# Patient Record
Sex: FEMALE | Race: WHITE | Employment: FULL TIME | URBAN - METROPOLITAN AREA
[De-identification: names, ages, dates, MRNs, and addresses within clinical notes are randomized per-mention and may not be internally consistent; named-entity substitution may affect disease eponyms.]

---

## 2017-08-18 ENCOUNTER — HOSPITAL ENCOUNTER (OUTPATIENT)
Age: 22
Discharge: HOME | End: 2017-08-18
Attending: FAMILY MEDICINE
Payer: OTHER MISCELLANEOUS

## 2017-08-18 VITALS
HEART RATE: 90 BPM | SYSTOLIC BLOOD PRESSURE: 134 MMHG | BODY MASS INDEX: 25.46 KG/M2 | WEIGHT: 118 LBS | OXYGEN SATURATION: 97 % | HEIGHT: 57 IN | RESPIRATION RATE: 18 BRPM | DIASTOLIC BLOOD PRESSURE: 80 MMHG | TEMPERATURE: 99 F

## 2017-08-18 DIAGNOSIS — S39.012A LUMBAR STRAIN, INITIAL ENCOUNTER: Primary | ICD-10-CM

## 2017-08-18 PROCEDURE — 96372 THER/PROPH/DIAG INJ SC/IM: CPT

## 2017-08-18 PROCEDURE — 99203 OFFICE O/P NEW LOW 30 MIN: CPT

## 2017-08-18 PROCEDURE — 99204 OFFICE O/P NEW MOD 45 MIN: CPT

## 2017-08-18 RX ORDER — CYCLOBENZAPRINE HCL 10 MG
10 TABLET ORAL NIGHTLY
Qty: 7 TABLET | Refills: 0 | Status: SHIPPED | OUTPATIENT
Start: 2017-08-18 | End: 2017-08-25

## 2017-08-18 RX ORDER — NAPROXEN 375 MG/1
375 TABLET ORAL 2 TIMES DAILY WITH MEALS
Qty: 20 TABLET | Refills: 0 | Status: SHIPPED | OUTPATIENT
Start: 2017-08-18

## 2017-08-18 RX ORDER — KETOROLAC TROMETHAMINE 30 MG/ML
60 INJECTION, SOLUTION INTRAMUSCULAR; INTRAVENOUS ONCE
Status: COMPLETED | OUTPATIENT
Start: 2017-08-18 | End: 2017-08-18

## 2017-08-18 NOTE — ED PROVIDER NOTES
Patient Seen in: [address]    History   Patient presents with:  Hip Pain    Stated Complaint: Righ low back injury workers comp x 1 day    HPI    Patient visiting from [place] with past medical history significant for bon TTP  No Midline Sacral TTP  No Paraspinal Sacral TTP    R SLR neg  L SLR neg    Pain w/ L Hip Flexion neg  Pain w/ L Hip Rotation neg  Pain w/ R Hip Flexion neg  Pain w/ R internal Hip Rotation neg.  Pain with external rotation    Full Forward Flexion with

## 2017-08-18 NOTE — ED INITIAL ASSESSMENT (HPI)
Was at work party dancing last night. Went back to hotel room, laid down and had right hip pain. Took Tylenol at 0800 and used [employer].